# Patient Record
Sex: FEMALE | Race: BLACK OR AFRICAN AMERICAN | URBAN - METROPOLITAN AREA
[De-identification: names, ages, dates, MRNs, and addresses within clinical notes are randomized per-mention and may not be internally consistent; named-entity substitution may affect disease eponyms.]

---

## 2020-11-25 ENCOUNTER — OFFICE VISIT (OUTPATIENT)
Dept: URBAN - METROPOLITAN AREA TELEHEALTH 2 | Facility: TELEHEALTH | Age: 55
End: 2020-11-25
Payer: COMMERCIAL

## 2020-11-25 DIAGNOSIS — K59.01 CONSTIPATION: ICD-10-CM

## 2020-11-25 PROCEDURE — 99213 OFFICE O/P EST LOW 20 MIN: CPT | Performed by: INTERNAL MEDICINE

## 2020-11-25 RX ORDER — LINACLOTIDE 290 UG/1
1 CAPSULE AT LEAST 30 MINUTES BEFORE THE FIRST MEAL OF THE DAY ON AN EMPTY STOMACH CAPSULE, GELATIN COATED ORAL ONCE A DAY
Qty: 30 | Refills: 2 | OUTPATIENT
Start: 2020-11-25 | End: 2021-02-23

## 2020-11-25 NOTE — HPI-OTHER HISTORIES
(2019) 54 yof, coming because of single episode of rectal bleeding, following a severe episode of constipation, unresponsive to Linzess. Last week, she took a laxative and had a difficult BM with bright red blood mixed with the stool.   A colonoscopy 2016 demonstrated ascending colon diverticular disease.   Internal hemorrhoids is the likely cause of bleeding.   Will nevertheless repeat the colonoscopy to R/O neoplasia.

## 2020-11-25 NOTE — HPI-TODAY'S VISIT:
Patient is here today complaining of  constipation which has been an inssue for over a decade.   Bowel movents occur anyweher from 1 - 7 days.  Linzess 145 mcg was never effective.    She reports excessive bloaing with the occasional abdominal pain.  Occasionally she notes blood in the stool only with straining. The patient never had the repeat colonoscopy.

## 2021-01-11 ENCOUNTER — OFFICE VISIT (OUTPATIENT)
Dept: URBAN - METROPOLITAN AREA TELEHEALTH 2 | Facility: TELEHEALTH | Age: 56
End: 2021-01-11
Payer: COMMERCIAL

## 2021-01-11 DIAGNOSIS — K59.01 CONSTIPATION: ICD-10-CM

## 2021-01-11 PROCEDURE — 99213 OFFICE O/P EST LOW 20 MIN: CPT | Performed by: INTERNAL MEDICINE

## 2021-01-11 RX ORDER — PLECANATIDE 3 MG/1
1 TABLET TABLET ORAL ONCE A DAY
Qty: 30 | Refills: 6 | OUTPATIENT
Start: 2021-01-11 | End: 2021-08-09

## 2021-01-11 RX ORDER — LINACLOTIDE 290 UG/1
1 CAPSULE AT LEAST 30 MINUTES BEFORE THE FIRST MEAL OF THE DAY ON AN EMPTY STOMACH CAPSULE, GELATIN COATED ORAL ONCE A DAY
Qty: 30 | Refills: 2 | Status: ACTIVE | COMMUNITY
Start: 2020-11-25 | End: 2021-02-23

## 2021-01-11 RX ORDER — LINACLOTIDE 290 UG/1
1 CAPSULE AT LEAST 30 MINUTES BEFORE THE FIRST MEAL OF THE DAY ON AN EMPTY STOMACH CAPSULE, GELATIN COATED ORAL ONCE A DAY
OUTPATIENT
Start: 2020-11-25 | End: 2021-02-23

## 2021-01-11 NOTE — HPI-TODAY'S VISIT:
(November 25, 2020) Patient is here today complaining of  constipation which has been an inssue for over a decade.   Bowel movents occur anyweher from 1 - 7 days.  Linzess 145 mcg was never effective.    She reports excessive bloaing with the occasional abdominal pain.  Occasionally she notes blood in the stool only with straining. The patient never had the repeat colonoscopy.  (January 2021) The patient is here for follow up of constipation.  Bowel movements occur every 1 - 4 days.   Linzess 290 mcg has relieved the straining.  She still has the sensation of fullness and bloating.

## 2021-05-16 ENCOUNTER — TELEPHONE ENCOUNTER (OUTPATIENT)
Dept: URBAN - METROPOLITAN AREA CLINIC 92 | Facility: CLINIC | Age: 56
End: 2021-05-16

## 2021-05-16 RX ORDER — PLECANATIDE 3 MG/1
1 TABLET TABLET ORAL ONCE A DAY
OUTPATIENT
Start: 2021-05-20 | End: 2021-06-19

## 2021-05-16 RX ORDER — LINACLOTIDE 290 UG/1
1 CAPSULE AT LEAST 30 MINUTES BEFORE THE FIRST MEAL OF THE DAY ON AN EMPTY STOMACH CAPSULE, GELATIN COATED ORAL ONCE A DAY
Qty: 90 | Refills: 3 | OUTPATIENT
Start: 2021-05-26 | End: 2022-05-21

## 2021-05-16 RX ORDER — PLECANATIDE 3 MG/1
1 TABLET TABLET ORAL ONCE A DAY
OUTPATIENT
Start: 2021-01-11 | End: 2021-08-09

## 2021-05-20 ENCOUNTER — WEB ENCOUNTER (OUTPATIENT)
Dept: URBAN - METROPOLITAN AREA CLINIC 17 | Facility: CLINIC | Age: 56
End: 2021-05-20

## 2021-05-20 ENCOUNTER — OFFICE VISIT (OUTPATIENT)
Dept: URBAN - METROPOLITAN AREA CLINIC 17 | Facility: CLINIC | Age: 56
End: 2021-05-20
Payer: COMMERCIAL

## 2021-05-20 ENCOUNTER — DASHBOARD ENCOUNTERS (OUTPATIENT)
Age: 56
End: 2021-05-20

## 2021-05-20 ENCOUNTER — LAB OUTSIDE AN ENCOUNTER (OUTPATIENT)
Dept: URBAN - METROPOLITAN AREA CLINIC 17 | Facility: CLINIC | Age: 56
End: 2021-05-20

## 2021-05-20 DIAGNOSIS — K59.09 CHRONIC CONSTIPATION: ICD-10-CM

## 2021-05-20 DIAGNOSIS — D50.8 OTHER IRON DEFICIENCY ANEMIA: ICD-10-CM

## 2021-05-20 DIAGNOSIS — R12 HEARTBURN: ICD-10-CM

## 2021-05-20 DIAGNOSIS — I10 ESSENTIAL HYPERTENSION: ICD-10-CM

## 2021-05-20 DIAGNOSIS — E13.9 DIABETES 1.5, MANAGED AS TYPE 2: ICD-10-CM

## 2021-05-20 DIAGNOSIS — K92.1 BLOOD IN STOOL: ICD-10-CM

## 2021-05-20 DIAGNOSIS — R63.4 WEIGHT LOSS: ICD-10-CM

## 2021-05-20 PROBLEM — 87522002: Status: ACTIVE | Noted: 2021-05-20

## 2021-05-20 PROBLEM — 59621000: Status: ACTIVE | Noted: 2021-05-20

## 2021-05-20 PROBLEM — 426875007: Status: ACTIVE | Noted: 2021-05-20

## 2021-05-20 PROCEDURE — 99214 OFFICE O/P EST MOD 30 MIN: CPT | Performed by: INTERNAL MEDICINE

## 2021-05-20 RX ORDER — METFORMIN HYDROCHLORIDE 500 MG/1
TABLET, EXTENDED RELEASE ORAL
Qty: 90 UNSPECIFIED | Status: ACTIVE | COMMUNITY

## 2021-05-20 RX ORDER — ATORVASTATIN CALCIUM 20 MG/1
TABLET, FILM COATED ORAL
Qty: 90 UNSPECIFIED | Status: ACTIVE | COMMUNITY

## 2021-05-20 RX ORDER — PLECANATIDE 3 MG/1
1 TABLET TABLET ORAL ONCE A DAY
Qty: 30 | OUTPATIENT
Start: 2021-05-20 | End: 2021-06-19

## 2021-05-20 RX ORDER — PLECANATIDE 3 MG/1
1 TABLET TABLET ORAL ONCE A DAY
Qty: 30 | Refills: 6 | Status: ACTIVE | COMMUNITY
Start: 2021-01-11 | End: 2021-08-09

## 2021-05-20 RX ORDER — OMEPRAZOLE 40 MG/1
1 CAPSULE 30 MINUTES BEFORE MORNING MEAL CAPSULE, DELAYED RELEASE ORAL ONCE A DAY
Qty: 90 | Refills: 0 | OUTPATIENT
Start: 2021-05-20

## 2021-05-20 RX ORDER — OLMESARTAN MEDOXOMIL AND HYDROCHLOROTHIAZIDE 20; 12.5 MG/1; MG/1
TABLET ORAL
Qty: 30 UNSPECIFIED | Status: ACTIVE | COMMUNITY

## 2021-05-20 RX ORDER — PANTOPRAZOLE SODIUM 40 MG/1
TABLET, DELAYED RELEASE ORAL
Qty: 30 UNSPECIFIED | Status: ACTIVE | COMMUNITY

## 2021-05-20 NOTE — HPI-TODAY'S VISIT:
5/20/21 57 yo lady pt of DR Lukas Burton.  She has seen Davy Najera and Cresencio Noyola in the past. I am seeing her as DR Najera is not in the office.  She has chronic GI issues which need managing.  She has a h/o chronic constipation and had been placedon Linzess. Recent diabetic (diagnosed 12/2020) and lost weight recently involuntarily - lost 149 lbs over 5 months. BAsed on our records, she weighed 142 lbs in 11/2020 and 1/2021.  Despite linzess 290 mcg can go 5 days w/o a BM . She saw Dr Najera in 1/2020 Trulance was prescribed (she never took it, does not recall why). MR defecography was ordered ( insurance didnt cover). 'Had c/o blood in stool, DR najera recommended repeat colonoscopy. No more blood in stool per pt. She c/o acid reflux for which she takes pantoprazole 40 mg. She thinks control is about 60%. She does not completely avoid reflux inducing foods.  She was also recently told she is anemic. we do not have records.   11/2020 DR Najera Patient is here today complaining of  constipation which has been an inssue for over a decade.   Bowel movents occur anyweher from 1 - 7 days.  Linzess 145 mcg was never effective.    She reports excessive bloaing with the occasional abdominal pain.  Occasionally she notes blood in the stool only with straining. The patient never had the repeat colonoscopy.  (January 2021) The patient is here for follow up of constipation.  Bowel movements occur every 1 - 4 days.   Linzess 290 mcg has relieved the straining.  She still has the sensation of fullness and bloating.

## 2021-05-21 PROBLEM — 14760008 CONSTIPATION: Status: ACTIVE | Noted: 2020-11-25

## 2021-05-21 PROBLEM — 89362005: Status: ACTIVE | Noted: 2021-05-21

## 2021-05-21 PROBLEM — 405729008: Status: ACTIVE | Noted: 2021-05-21

## 2021-05-26 ENCOUNTER — TELEPHONE ENCOUNTER (OUTPATIENT)
Dept: URBAN - METROPOLITAN AREA TELEHEALTH 2 | Facility: TELEHEALTH | Age: 56
End: 2021-05-26

## 2021-05-26 RX ORDER — LINACLOTIDE 290 UG/1
1 CAPSULE AT LEAST 30 MINUTES BEFORE THE FIRST MEAL OF THE DAY ON AN EMPTY STOMACH CAPSULE, GELATIN COATED ORAL ONCE A DAY
Qty: 30 | Refills: 2 | OUTPATIENT
Start: 2020-11-25 | End: 2021-05-26

## 2021-07-21 ENCOUNTER — OFFICE VISIT (OUTPATIENT)
Dept: URBAN - METROPOLITAN AREA SURGERY CENTER 30 | Facility: SURGERY CENTER | Age: 56
End: 2021-07-21